# Patient Record
Sex: FEMALE | Race: WHITE | URBAN - METROPOLITAN AREA
[De-identification: names, ages, dates, MRNs, and addresses within clinical notes are randomized per-mention and may not be internally consistent; named-entity substitution may affect disease eponyms.]

---
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
## 2020-05-22 ENCOUNTER — TELEPHONE (OUTPATIENT)
Dept: FAMILY MEDICINE | Facility: CLINIC | Age: 20
End: 2020-05-22

## 2020-05-22 NOTE — TELEPHONE ENCOUNTER
[facility] Call Center    Phone Message    May a detailed message be left on voicemail: yes     Reason for Call: Other: [place]. The pt has been prescribed birth control by a different MD. She cannot reach him due to Covid? The pt is asking if Dr Ford will refill her birth control meds? Please call the pt to discuss. Thanks.    Action Taken: Message routed to:  [facility] ([facility]): Adrienne norwood prim    Travel Screening: Not Applicable

## 2020-05-22 NOTE — TELEPHONE ENCOUNTER
Called and LVM - patient will need to follow up with ordering provider or that clinic for refills. Otherwise go to an Urgent Care for med refill. Patient has not been in this clinic in 3 years, we are not able to prescribed medication for her.     Louise Chirinos RN  05/22/20  3:45 PM

## 2022-06-22 NOTE — PATIENT INSTRUCTIONS
Send me date of   Menactra (meningitis) vaccine  HPV vaccine dates    Calcium 1200mg daily  Vitamin D 1000 international unit(s) daily    Tetanus will do today    Preventive Health Recommendations  Female Ages 21 to 25     Yearly exam:   See your health care provider every year in order to  Review health changes.   Discuss preventive care.    Review your medicines if your doctor has prescribed any.    You should be tested each year for STDs (sexually transmitted diseases).     Talk to your provider about how often you should have cholesterol testing.    Get a Pap test every three years. If you have an abnormal result, your doctor may have you test more often.    If you are at risk for diabetes, you should have a diabetes test (fasting glucose).     Shots:   Get a flu shot each year.   Get a tetanus shot every 10 years.   Consider getting the shot (vaccine) that prevents cervical cancer (Gardasil).    Nutrition:   Eat at least 5 servings of fruits and vegetables each day.  Eat whole-grain bread, whole-wheat pasta and brown rice instead of white grains and rice.  Get adequate Calcium and Vitamin D.     Lifestyle  Exercise at least 150 minutes a week each week (30 minutes a day, 5 days a week). This will help you control your weight and prevent disease.  Limit alcohol to one drink per day.  No smoking.   Wear sunscreen to prevent skin cancer.  See your dentist every six months for an exam and cleaning.

## 2022-06-23 ENCOUNTER — OFFICE VISIT (OUTPATIENT)
Dept: FAMILY MEDICINE | Facility: CLINIC | Age: 22
End: 2022-06-23
Payer: COMMERCIAL

## 2022-06-23 VITALS
DIASTOLIC BLOOD PRESSURE: 80 MMHG | OXYGEN SATURATION: 97 % | HEART RATE: 97 BPM | WEIGHT: 157.04 LBS | BODY MASS INDEX: 25.24 KG/M2 | SYSTOLIC BLOOD PRESSURE: 125 MMHG | TEMPERATURE: 98 F | HEIGHT: 66 IN

## 2022-06-23 DIAGNOSIS — Z11.3 SCREEN FOR STD (SEXUALLY TRANSMITTED DISEASE): ICD-10-CM

## 2022-06-23 DIAGNOSIS — Z12.4 SCREENING FOR CERVICAL CANCER: ICD-10-CM

## 2022-06-23 DIAGNOSIS — F41.1 GENERALIZED ANXIETY DISORDER: ICD-10-CM

## 2022-06-23 DIAGNOSIS — F33.1 MODERATE EPISODE OF RECURRENT MAJOR DEPRESSIVE DISORDER (H): ICD-10-CM

## 2022-06-23 DIAGNOSIS — Z13.220 SCREENING FOR LIPID DISORDERS: ICD-10-CM

## 2022-06-23 DIAGNOSIS — Z00.00 ROUTINE GENERAL MEDICAL EXAMINATION AT A HEALTH CARE FACILITY: Primary | ICD-10-CM

## 2022-06-23 DIAGNOSIS — Z23 NEED FOR VACCINATION: ICD-10-CM

## 2022-06-23 DIAGNOSIS — F51.01 PRIMARY INSOMNIA: ICD-10-CM

## 2022-06-23 DIAGNOSIS — N94.6 DYSMENORRHEA: ICD-10-CM

## 2022-06-23 LAB
CHOLEST SERPL-MCNC: 195 MG/DL
HDLC SERPL-MCNC: 68 MG/DL
LDLC SERPL CALC-MCNC: 101 MG/DL
NONHDLC SERPL-MCNC: 127 MG/DL
TRIGL SERPL-MCNC: 132 MG/DL

## 2022-06-23 PROCEDURE — 87491 CHLMYD TRACH DNA AMP PROBE: CPT | Performed by: INTERNAL MEDICINE

## 2022-06-23 PROCEDURE — G0145 SCR C/V CYTO,THINLAYER,RESCR: HCPCS | Performed by: INTERNAL MEDICINE

## 2022-06-23 PROCEDURE — 87591 N.GONORRHOEAE DNA AMP PROB: CPT | Performed by: INTERNAL MEDICINE

## 2022-06-23 PROCEDURE — 86803 HEPATITIS C AB TEST: CPT | Performed by: INTERNAL MEDICINE

## 2022-06-23 PROCEDURE — 87389 HIV-1 AG W/HIV-1&-2 AB AG IA: CPT | Performed by: INTERNAL MEDICINE

## 2022-06-23 PROCEDURE — 80061 LIPID PANEL: CPT | Performed by: INTERNAL MEDICINE

## 2022-06-23 RX ORDER — TRAZODONE HYDROCHLORIDE 50 MG/1
50 TABLET, FILM COATED ORAL AT BEDTIME
Qty: 90 TABLET | Refills: 0 | Status: SHIPPED | OUTPATIENT
Start: 2022-06-23 | End: 2022-07-20

## 2022-06-23 RX ORDER — NORGESTIMATE AND ETHINYL ESTRADIOL 0.25-0.035
KIT ORAL
Qty: 84 TABLET | Refills: 3 | Status: SHIPPED | OUTPATIENT
Start: 2022-06-23 | End: 2023-03-10

## 2022-06-23 RX ORDER — BUSPIRONE HYDROCHLORIDE 5 MG/1
TABLET ORAL
COMMUNITY
Start: 2022-05-16 | End: 2022-06-23

## 2022-06-23 RX ORDER — TRAZODONE HYDROCHLORIDE 50 MG/1
TABLET, FILM COATED ORAL
COMMUNITY
Start: 2022-05-21 | End: 2022-06-23

## 2022-06-23 RX ORDER — BUSPIRONE HYDROCHLORIDE 5 MG/1
TABLET ORAL
COMMUNITY
Start: 2022-06-23 | End: 2022-07-20

## 2022-06-23 RX ORDER — ESCITALOPRAM OXALATE 20 MG/1
TABLET ORAL
COMMUNITY
Start: 2022-06-13 | End: 2022-07-21

## 2022-06-23 RX ORDER — FLUOXETINE 10 MG/1
10 CAPSULE ORAL DAILY
Qty: 30 CAPSULE | Refills: 1 | Status: SHIPPED | OUTPATIENT
Start: 2022-06-23 | End: 2022-07-18

## 2022-06-23 ASSESSMENT — ANXIETY QUESTIONNAIRES
5. BEING SO RESTLESS THAT IT IS HARD TO SIT STILL: SEVERAL DAYS
GAD7 TOTAL SCORE: 16
2. NOT BEING ABLE TO STOP OR CONTROL WORRYING: NEARLY EVERY DAY
3. WORRYING TOO MUCH ABOUT DIFFERENT THINGS: NEARLY EVERY DAY
6. BECOMING EASILY ANNOYED OR IRRITABLE: SEVERAL DAYS
GAD7 TOTAL SCORE: 16
1. FEELING NERVOUS, ANXIOUS, OR ON EDGE: NEARLY EVERY DAY
IF YOU CHECKED OFF ANY PROBLEMS ON THIS QUESTIONNAIRE, HOW DIFFICULT HAVE THESE PROBLEMS MADE IT FOR YOU TO DO YOUR WORK, TAKE CARE OF THINGS AT HOME, OR GET ALONG WITH OTHER PEOPLE: SOMEWHAT DIFFICULT
7. FEELING AFRAID AS IF SOMETHING AWFUL MIGHT HAPPEN: MORE THAN HALF THE DAYS

## 2022-06-23 ASSESSMENT — PATIENT HEALTH QUESTIONNAIRE - PHQ9
5. POOR APPETITE OR OVEREATING: NEARLY EVERY DAY
SUM OF ALL RESPONSES TO PHQ QUESTIONS 1-9: 13

## 2022-06-23 NOTE — NURSING NOTE
"22 year old  Chief Complaint   Patient presents with     Physical     Pt wanting to change her medications (lexapro) due to gaining. Pt's dog just had round worms and is wondering if she should be worried.       Blood pressure 125/80, pulse 97, temperature 98  F (36.7  C), height 1.677 m (5' 6.02\"), weight 71.2 kg (157 lb 0.6 oz), last menstrual period 06/09/2022, SpO2 97 %, not currently breastfeeding. Body mass index is 25.33 kg/m .  Patient Active Problem List   Diagnosis     Curvature of spine     Back pain     Scoliosis       Wt Readings from Last 2 Encounters:   06/23/22 71.2 kg (157 lb 0.6 oz)   07/14/17 51.7 kg (114 lb) (23 %, Z= -0.73)*     * Growth percentiles are based on [facility] (Girls, 2-20 Years) data.     BP Readings from Last 3 Encounters:   06/23/22 125/80   07/28/17 102/63   07/14/17 109/67         Current Outpatient Medications   Medication     Acetaminophen (MIDOL PO)     busPIRone (BUSPAR) 5 MG tablet     escitalopram (LEXAPRO) 20 MG tablet     Multiple Vitamins-Minerals (WOMENS MULTIVITAMIN  PLUS) TABS     norgestimate-ethinyl estradiol (SPRINTEC 28) 0.25-35 MG-MCG per tablet     Probiotic Product (PROBIOTIC PO)     traZODone (DESYREL) 50 MG tablet     amoxicillin (AMOXIL) 400 MG/5ML suspension     HYDROcodone-acetaminophen (LORTAB) 7.5-325 MG/15ML solution     No current facility-administered medications for this visit.       Social History     Tobacco Use     Smoking status: Never Smoker     Smokeless tobacco: Never Used     Tobacco comment: no second hand exposure   Substance Use Topics     Alcohol use: Yes     Comment: 5 drinks a week     Drug use: Yes     Types: Marijuana       Health Maintenance Due   Topic Date Due     CHLAMYDIA SCREENING  Never done     ADVANCE CARE PLANNING  Never done     HIV SCREENING  Never done     HEPATITIS C SCREENING  Never done     HPV IMMUNIZATION (3 - 3-dose series) 02/04/2019     PAP  Never done     DTAP/TDAP/TD IMMUNIZATION (7 - Td or Tdap) 05/17/2020     PHQ-2 " (once per calendar year)  01/01/2022       No results found for: PAP      June 23, 2022 2:53 PM

## 2022-06-23 NOTE — NURSING NOTE
Prior to immunization administration, verified patients identity using patient s name and date of birth. Please see Immunization Activity for additional information.     Screening Questionnaire for Adult Immunization    Are you sick today?   No   Do you have allergies to medications, food, a vaccine component or latex?   No   Have you ever had a serious reaction after receiving a vaccination?   No   Do you have a long-term health problem with heart, lung, kidney, or metabolic disease (e.g., diabetes), asthma, a blood disorder, no spleen, complement component deficiency, a cochlear implant, or a spinal fluid leak?  Are you on long-term aspirin therapy?   No   Do you have cancer, leukemia, HIV/AIDS, or any other immune system problem?   No   Do you have a parent, brother, or sister with an immune system problem?   No   In the past 3 months, have you taken medications that affect  your immune system, such as prednisone, other steroids, or anticancer drugs; drugs for the treatment of rheumatoid arthritis, Crohn s disease, or psoriasis; or have you had radiation treatments?   No   Have you had a seizure, or a brain or other nervous system problem?   No   During the past year, have you received a transfusion of blood or blood    products, or been given immune (gamma) globulin or antiviral drug?   No   For women: Are you pregnant or is there a chance you could become       pregnant during the next month?   No   Have you received any vaccinations in the past 4 weeks?   No     Immunization questionnaire answers were all negative.        Per orders of Dr. Ford, injection of Tdap given by Haydee Chiu MA. Patient instructed to remain in clinic for 15 minutes afterwards, and to report any adverse reaction to me immediately.       Screening performed by Haydee Chiu MA on 6/23/2022 at 4:16 PM.
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
## 2022-06-24 LAB
C TRACH DNA SPEC QL NAA+PROBE: NEGATIVE
HCV AB SERPL QL IA: NONREACTIVE
HIV 1+2 AB+HIV1 P24 AG SERPL QL IA: NONREACTIVE
N GONORRHOEA DNA SPEC QL NAA+PROBE: NEGATIVE

## 2022-06-28 PROBLEM — N94.6 DYSMENORRHEA: Status: ACTIVE | Noted: 2022-06-28

## 2022-06-28 PROBLEM — F41.1 GENERALIZED ANXIETY DISORDER: Status: ACTIVE | Noted: 2022-06-28

## 2022-06-28 PROBLEM — F51.01 PRIMARY INSOMNIA: Status: ACTIVE | Noted: 2022-06-28

## 2022-06-28 PROBLEM — F33.1 MODERATE EPISODE OF RECURRENT MAJOR DEPRESSIVE DISORDER (H): Status: ACTIVE | Noted: 2022-06-28

## 2022-06-28 LAB
BKR LAB AP GYN ADEQUACY: NORMAL
BKR LAB AP GYN INTERPRETATION: NORMAL
BKR LAB AP HPV REFLEX: NORMAL
BKR LAB AP LMP: NORMAL
BKR LAB AP PREVIOUS ABNORMAL: NORMAL
PATH REPORT.COMMENTS IMP SPEC: NORMAL
PATH REPORT.COMMENTS IMP SPEC: NORMAL
PATH REPORT.RELEVANT HX SPEC: NORMAL

## 2022-07-15 DIAGNOSIS — F33.1 MODERATE EPISODE OF RECURRENT MAJOR DEPRESSIVE DISORDER (H): ICD-10-CM

## 2022-07-15 DIAGNOSIS — F51.01 PRIMARY INSOMNIA: ICD-10-CM

## 2022-07-15 DIAGNOSIS — F41.1 GENERALIZED ANXIETY DISORDER: ICD-10-CM

## 2022-07-18 RX ORDER — FLUOXETINE 10 MG/1
10 CAPSULE ORAL DAILY
Qty: 30 CAPSULE | Refills: 0 | Status: SHIPPED | OUTPATIENT
Start: 2022-07-18 | End: 2022-07-21

## 2022-07-18 NOTE — TELEPHONE ENCOUNTER
Fluoxetine (Prozac) 10 mg    Last Office Visit: 6/23/22  [facility] [place]: 8/4/22  Medication last refilled: 6/23/22 #30 with 1 refill(s)    Last PHQ-9 6/23/2022   PHQ-9 Total Score 13     LARISA-7 SCORE 6/23/2022   Total Score 16     Prescription approved per [facility] Refill Protocol.    YESENIA BrothersN, RN, CCM

## 2022-07-20 ENCOUNTER — MYC MEDICAL ADVICE (OUTPATIENT)
Dept: FAMILY MEDICINE | Facility: CLINIC | Age: 22
End: 2022-07-20

## 2022-07-20 ENCOUNTER — MYC REFILL (OUTPATIENT)
Dept: FAMILY MEDICINE | Facility: CLINIC | Age: 22
End: 2022-07-20

## 2022-07-20 DIAGNOSIS — F51.01 PRIMARY INSOMNIA: ICD-10-CM

## 2022-07-20 DIAGNOSIS — F33.1 MODERATE EPISODE OF RECURRENT MAJOR DEPRESSIVE DISORDER (H): ICD-10-CM

## 2022-07-20 DIAGNOSIS — F41.1 GENERALIZED ANXIETY DISORDER: ICD-10-CM

## 2022-07-21 RX ORDER — TRAZODONE HYDROCHLORIDE 50 MG/1
50 TABLET, FILM COATED ORAL AT BEDTIME
Qty: 90 TABLET | Refills: 0 | Status: SHIPPED | OUTPATIENT
Start: 2022-07-21 | End: 2022-11-04

## 2022-07-21 RX ORDER — FLUOXETINE 10 MG/1
10 CAPSULE ORAL DAILY
Qty: 30 CAPSULE | Refills: 0 | Status: CANCELLED | OUTPATIENT
Start: 2022-07-21

## 2022-07-21 RX ORDER — BUSPIRONE HYDROCHLORIDE 5 MG/1
TABLET ORAL
Qty: 90 TABLET | Refills: 0 | Status: SHIPPED | OUTPATIENT
Start: 2022-07-21 | End: 2022-07-22 | Stop reason: DRUGHIGH

## 2022-07-21 NOTE — TELEPHONE ENCOUNTER
Trazodone (Desyrel) 50 mg + Buspirone (Buspar) 5 mg    Last Office Visit: 6/23/22  [facility] [place]4/22  Medication last refilled:     6/23/22 #90 with 0 refill(s)-Trazodone  6/23/22 #90 with 4 refill(s)-Buspirone (By Dr. Lim)    Last PHQ-9 6/23/2022   PHQ-9 Total Score 13     LARISA-7 SCORE 6/23/2022   Total Score 16     Routing refill request to provider for review/approval because:  Drug interaction warning between Fluoxetine and Trazodone.    Meri Do, BSN, RN, CCM

## 2022-07-21 NOTE — TELEPHONE ENCOUNTER
Meds refilled while PCP, Dr. Ford, is away.    Radha was seen today for refill request.    Diagnoses and all orders for this visit:    Primary insomnia  -     traZODone (DESYREL) 50 MG tablet; Take 1 tablet (50 mg) by mouth At Bedtime    Generalized anxiety disorder  -     busPIRone (BUSPAR) 5 MG tablet; Take 10mg up to three times a day prn anxiety    Moderate episode of recurrent major depressive disorder (H)      Miguel Barcenas MD  12:59 PM, July 21, 2022

## 2022-07-22 RX ORDER — BUSPIRONE HYDROCHLORIDE 5 MG/1
5 TABLET ORAL 3 TIMES DAILY PRN
Qty: 90 TABLET | Refills: 0 | Status: SHIPPED | OUTPATIENT
Start: 2022-07-22 | End: 2022-08-19

## 2022-08-18 DIAGNOSIS — F41.1 GENERALIZED ANXIETY DISORDER: ICD-10-CM

## 2022-08-18 NOTE — TELEPHONE ENCOUNTER
Last time prescribed: 7/22/22 , 90 tabs/caps x 0 refills  Last office visit: 6/23/22  Next appointment: No Future Appointment Scheduled

## 2022-08-19 RX ORDER — BUSPIRONE HYDROCHLORIDE 5 MG/1
5 TABLET ORAL 3 TIMES DAILY PRN
Qty: 90 TABLET | Refills: 1 | Status: SHIPPED | OUTPATIENT
Start: 2022-08-19 | End: 2022-10-19

## 2022-08-19 NOTE — TELEPHONE ENCOUNTER
Buspirone (Buspar) 5 mg    Last Office Visit: 6/23/22  [facility] [place] None  Medication last refilled: 7/22/22 #90 with 0 refill(s)    Last PHQ-9 6/23/2022   PHQ-9 Total Score 13     LARISA-7 SCORE 6/23/2022   Total Score 16     Prescription approved per [facility] Refill Protocol.    YESENIA BrothersN, RN, CCM

## 2022-10-18 DIAGNOSIS — F41.1 GENERALIZED ANXIETY DISORDER: ICD-10-CM

## 2022-10-18 DIAGNOSIS — F33.1 MODERATE EPISODE OF RECURRENT MAJOR DEPRESSIVE DISORDER (H): ICD-10-CM

## 2022-10-19 RX ORDER — BUSPIRONE HYDROCHLORIDE 5 MG/1
5 TABLET ORAL 3 TIMES DAILY PRN
Qty: 90 TABLET | Refills: 0 | Status: SHIPPED | OUTPATIENT
Start: 2022-10-19 | End: 2022-11-04

## 2022-10-19 NOTE — TELEPHONE ENCOUNTER
Medication requested: FLUoxetine (PROZAC) 20 MG capsule  Last office visit: 6/23/22  [facility] appointments: none  Medication last refilled: 7/21/22; 90 + 0 refills  Last qualifying labs:   PHQ-9 / LARISA-7 Scores 8/2015 to present 6/23/2022   LARISA-7 Score DocFlow 16   PHQ-9 Score DocFlow 13     Medication is being filled for 1 time refill only due to:  Pt was due to f/u for mental health med check in August, canceled appt same day and has not rescheduled    Sent message to pt to schedule follow-up with Dr. Ford.    Dick OZUNA, RN  10/19/22 12:48 PM

## 2022-10-19 NOTE — TELEPHONE ENCOUNTER
Medication requested: busPIRone (BUSPAR) 5 MG tablet  Last office visit: 6/23/22  [facility] appointments: none  Medication last refilled: 8/19/22; 90 + 1 refill  Last qualifying labs:   PHQ-9 / LARISA-7 Scores 8/2015 to present 6/23/2022   LARISA-7 Score DocFlow 16     Medication is being filled for 1 time refill only due to:  Pt was due to f/u for mental health med check in August, canceled appt same day and has not rescheduled    Sent message to pt to schedule follow-up with Dr. Ford.    Dick OZUNA, RN  10/19/22 12:57 PM

## 2022-11-04 ENCOUNTER — OFFICE VISIT (OUTPATIENT)
Dept: FAMILY MEDICINE | Facility: CLINIC | Age: 22
End: 2022-11-04
Payer: COMMERCIAL

## 2022-11-04 VITALS
OXYGEN SATURATION: 97 % | TEMPERATURE: 97.9 F | WEIGHT: 157.12 LBS | DIASTOLIC BLOOD PRESSURE: 73 MMHG | SYSTOLIC BLOOD PRESSURE: 104 MMHG | HEART RATE: 82 BPM | BODY MASS INDEX: 25.34 KG/M2 | RESPIRATION RATE: 16 BRPM

## 2022-11-04 DIAGNOSIS — Z23 NEED FOR PROPHYLACTIC VACCINATION AND INOCULATION AGAINST INFLUENZA: ICD-10-CM

## 2022-11-04 DIAGNOSIS — F41.1 GENERALIZED ANXIETY DISORDER: ICD-10-CM

## 2022-11-04 DIAGNOSIS — F33.1 MODERATE EPISODE OF RECURRENT MAJOR DEPRESSIVE DISORDER (H): Primary | ICD-10-CM

## 2022-11-04 DIAGNOSIS — F51.01 PRIMARY INSOMNIA: ICD-10-CM

## 2022-11-04 RX ORDER — TRAZODONE HYDROCHLORIDE 50 MG/1
TABLET, FILM COATED ORAL
Qty: 90 TABLET | Refills: 1 | Status: SHIPPED | OUTPATIENT
Start: 2022-11-04 | End: 2023-03-28

## 2022-11-04 RX ORDER — FLUOXETINE 40 MG/1
40 CAPSULE ORAL DAILY
Qty: 90 CAPSULE | Refills: 1 | Status: SHIPPED | OUTPATIENT
Start: 2022-11-04 | End: 2023-05-17

## 2022-11-04 RX ORDER — BUSPIRONE HYDROCHLORIDE 5 MG/1
5 TABLET ORAL 3 TIMES DAILY PRN
Qty: 90 TABLET | Refills: 0 | Status: SHIPPED | OUTPATIENT
Start: 2022-11-04 | End: 2023-05-25

## 2022-11-04 ASSESSMENT — PATIENT HEALTH QUESTIONNAIRE - PHQ9
SUM OF ALL RESPONSES TO PHQ QUESTIONS 1-9: 13
5. POOR APPETITE OR OVEREATING: SEVERAL DAYS

## 2022-11-04 ASSESSMENT — ANXIETY QUESTIONNAIRES
GAD7 TOTAL SCORE: 13
2. NOT BEING ABLE TO STOP OR CONTROL WORRYING: NEARLY EVERY DAY
1. FEELING NERVOUS, ANXIOUS, OR ON EDGE: MORE THAN HALF THE DAYS
3. WORRYING TOO MUCH ABOUT DIFFERENT THINGS: NEARLY EVERY DAY
7. FEELING AFRAID AS IF SOMETHING AWFUL MIGHT HAPPEN: MORE THAN HALF THE DAYS
IF YOU CHECKED OFF ANY PROBLEMS ON THIS QUESTIONNAIRE, HOW DIFFICULT HAVE THESE PROBLEMS MADE IT FOR YOU TO DO YOUR WORK, TAKE CARE OF THINGS AT HOME, OR GET ALONG WITH OTHER PEOPLE: SOMEWHAT DIFFICULT
5. BEING SO RESTLESS THAT IT IS HARD TO SIT STILL: SEVERAL DAYS
GAD7 TOTAL SCORE: 13
6. BECOMING EASILY ANNOYED OR IRRITABLE: SEVERAL DAYS

## 2022-11-04 ASSESSMENT — PAIN SCALES - GENERAL: PAINLEVEL: NO PAIN (0)

## 2022-11-04 NOTE — PATIENT INSTRUCTIONS
Light Box information  Positioning and distance - The light box should be positioned at a distance that enables patients to receive 10,000 lux while seated and facing the box, with the light projected downward to minimize aversive glare. Commercial light box instructions should give the distance at which 10,000 lux is achieved, which is typically approximately 40 to 80 cm (16 to 31 inches). The distance from the eyes to the light box is important because light intensity follows the inverse square law; if the distance between the eye and the light source is doubled, the intensity of light that is received drops to one-quarter of the original intensity.  ?Time of day - Bright light therapy generally commences in the early morning, soon after awakening (eg, 7:00 AM). Patients should administer light therapy at approximately the same time each day, including weekends, holidays, and vacations. Most light therapy studies required a regular time for light treatment to start and thus stipulated a regular wake-up time for the subjects. A regular wake-up time may be important for optimal effectiveness of the bright light treatment. The effectiveness of variable timing of the bright light therapy is unknown.  If morning bright light treatment alone is not fully effective after two to four weeks of treatment, adding evening (eg, 8:00 PM) bright light treatment may be helpful. A minority of patients with SAD may benefit from bright light in the evening rather than morning bright light [81]. The self-report version of the Morningness-Eveningness Questionnaire is in the public domain and can be used to determine the best time of day to commence bright light therapy. However, the questionnaire is generally used for research rather than standard clinical care.  ?Duration of exposure - The duration of early morning exposure to standard light boxes emitting 10,000 lux is generally 30 minutes/day, but some studies have used 45 or 60  minutes per session. However, no head-to-head trials have compared the efficacy of different lengths of exposure.   For patients who do not respond to initial treatment with 30 minutes/day, some studies have increased the duration of exposure to 45 minutes/day, and if nonresponse persists, to 60 minutes/day (algorithm 1). However, no studies have compared the efficacy of a fixed dose of 30 minutes/day with an increasing dose, and the potential benefit of increasing exposure must be weighed against the added time burden.  If evening bright light therapy is added, the duration of evening exposure is generally 30 to 60 minutes. Patients who switch from morning to evening exposure continue the same length of exposure.  Bright light boxes emitting light that is less than 10,000 lux require longer exposures. As an example, morning light therapy with a 2500 lux light box requires an exposure of two hours to achieve the same benefit of 10,000 lux for 30 minutes.   ?Looking at the device - The eyes are open during bright light therapy, with light visible at least in the peripheral vision. Patients can glance at the box but should avoid staring directly at the light.  ?Patient activity - During bright light therapy, patients can engage in any activity, such as reading, eating, watching television, or working on a computer. Although patients are typically seated, it is reasonable to place the light box on a stand so that patients can engage in other activities, such as riding a stationary bicycle.
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
## 2023-01-12 DIAGNOSIS — F51.01 PRIMARY INSOMNIA: ICD-10-CM

## 2023-01-12 RX ORDER — TRAZODONE HYDROCHLORIDE 50 MG/1
TABLET, FILM COATED ORAL
Qty: 90 TABLET | Refills: 1 | Status: CANCELLED | OUTPATIENT
Start: 2023-01-12

## 2023-03-10 DIAGNOSIS — N94.6 DYSMENORRHEA: ICD-10-CM

## 2023-03-10 RX ORDER — NORGESTIMATE AND ETHINYL ESTRADIOL 0.25-0.035
KIT ORAL
Qty: 84 TABLET | Refills: 0 | Status: SHIPPED | OUTPATIENT
Start: 2023-03-10 | End: 2023-08-21

## 2023-03-10 NOTE — TELEPHONE ENCOUNTER
Medication requested: norgestimate-ethinyl estradiol (SPRINTEC 28) 0.25-35 MG-MCG tablet  Last office visit: 12/23/22  [facility] appointments: none  Medication last refilled: 6/23/22; 84 + 3 refills  Last qualifying labs:   BP Readings from Last 3 Encounters:   11/04/22 104/73   06/23/22 125/80   07/28/17 102/63     Prescription approved per [facility] Refill Protocol.    Pharmacy claims they do not have any refills remaining on prescription, but she should have one more 90 day supply. Will send one more 90 day supply as intended with original prescription.    Dick OZUNA, RN  03/10/23 10:32 AM

## 2023-03-28 DIAGNOSIS — F51.01 PRIMARY INSOMNIA: ICD-10-CM

## 2023-03-28 RX ORDER — TRAZODONE HYDROCHLORIDE 50 MG/1
TABLET, FILM COATED ORAL
Qty: 90 TABLET | Refills: 1 | Status: SHIPPED | OUTPATIENT
Start: 2023-03-28 | End: 2023-07-03

## 2023-03-28 NOTE — TELEPHONE ENCOUNTER
Medication requested: traZODone (DESYREL) 50 MG tablet  Last office visit: 11/4/22  [facility] appointments: none  Medication last refilled: 11/4/22; 90 + 1 refills  Last qualifying labs: N/A    Prescription approved per [facility] Refill Protocol.    Dick OZUNA, RN  03/28/23 4:12 PM

## 2023-05-15 DIAGNOSIS — F33.1 MODERATE EPISODE OF RECURRENT MAJOR DEPRESSIVE DISORDER (H): ICD-10-CM

## 2023-05-15 DIAGNOSIS — F41.1 GENERALIZED ANXIETY DISORDER: ICD-10-CM

## 2023-05-17 RX ORDER — FLUOXETINE 40 MG/1
40 CAPSULE ORAL DAILY
Qty: 30 CAPSULE | Refills: 0 | Status: SHIPPED | OUTPATIENT
Start: 2023-05-17 | End: 2023-06-21

## 2023-05-17 NOTE — TELEPHONE ENCOUNTER
Last visit 11/4/22, no future visit  Medication is being filled for 1 time refill only due to:  Patient needs to be seen because mental health check visit.   Routing refill request to provider for review/approval because:  Drug interaction warning      Sending [employer] message to pt that she needs appt in the next month. Refilling med x 30 days.      Adilene Potter RN  [facility]

## 2023-05-25 DIAGNOSIS — F41.1 GENERALIZED ANXIETY DISORDER: ICD-10-CM

## 2023-05-25 RX ORDER — BUSPIRONE HYDROCHLORIDE 5 MG/1
5 TABLET ORAL 3 TIMES DAILY PRN
Qty: 30 TABLET | Refills: 0 | Status: SHIPPED | OUTPATIENT
Start: 2023-05-25 | End: 2023-08-21

## 2023-05-25 NOTE — TELEPHONE ENCOUNTER
Buspirone (Buspar) 5 mg    Last Office Visit: 11/4/22  [place] [facility] [place]  Medication last refilled: 11/4/22 #90 with 0 refill(s)    PHQ-9 / LARISA-7 Scores 6/23/2022  3:30 PM 11/4/2022  9:06 AM   LARISA-7 Score DocFlow 16 13    PHQ-9 Score DocFlow 13 13      Medication is being filled for 1 time refill only due to:  Patient needs to be seen because due for mental health follow up.    mobileo message sent requesting Elo call to schedule an appointment.    Meri Do, YESENIAN, RN, CCM

## 2023-06-21 DIAGNOSIS — F41.1 GENERALIZED ANXIETY DISORDER: ICD-10-CM

## 2023-06-21 DIAGNOSIS — F33.1 MODERATE EPISODE OF RECURRENT MAJOR DEPRESSIVE DISORDER (H): ICD-10-CM

## 2023-06-21 RX ORDER — FLUOXETINE 40 MG/1
40 CAPSULE ORAL DAILY
Qty: 30 CAPSULE | Refills: 0 | Status: SHIPPED | OUTPATIENT
Start: 2023-06-21 | End: 2023-08-21

## 2023-06-21 NOTE — TELEPHONE ENCOUNTER
Fluoxetine (Prozac) 40 mg    Last Office Visit: 12/23/22  Future [facility] Appointments: None  Medication last refilled: 5/17/23 #30 with 0 refill(s)    PHQ-9 / LARISA-7 Scores  6/23/22 11/4/22   LARISA-7 Score DocFlow 16 4   PHQ-9 Score DocFlow 13 4     Medication is being filled for 1 time refill only due to:  Patient needs to be seen because due for mental health follow up.     Message sent to scheduling to call Elo to schedule as she has not read the [employer] request sent on 5/25/23.    Meri Do, YESENIAN, RN, CCM

## 2023-07-03 DIAGNOSIS — F51.01 PRIMARY INSOMNIA: ICD-10-CM

## 2023-07-03 RX ORDER — TRAZODONE HYDROCHLORIDE 50 MG/1
TABLET, FILM COATED ORAL
Qty: 30 TABLET | Refills: 0 | Status: SHIPPED | OUTPATIENT
Start: 2023-07-03 | End: 2023-08-21

## 2023-07-03 NOTE — TELEPHONE ENCOUNTER
Trazodone (Desyrel) 50 mg    Last Office Visit: 12/23/22  Future [facility] Appointments: None  Medication last refilled: 3/28/23 #90 with 1 refill(s)    Medication is being filled for 1 time refill only due to:  Patient needs to be seen because due for mental health follow up.    [employer] message sent to patient to call and schedule appointment.    YESENIA BrothersN, RN, CCM

## 2023-07-19 DIAGNOSIS — F41.1 GENERALIZED ANXIETY DISORDER: ICD-10-CM

## 2023-07-19 DIAGNOSIS — F33.1 MODERATE EPISODE OF RECURRENT MAJOR DEPRESSIVE DISORDER (H): ICD-10-CM

## 2023-07-20 RX ORDER — FLUOXETINE 40 MG/1
40 CAPSULE ORAL DAILY
Qty: 30 CAPSULE | Refills: 0 | OUTPATIENT
Start: 2023-07-20

## 2023-08-14 DIAGNOSIS — N94.6 DYSMENORRHEA: ICD-10-CM

## 2023-08-15 RX ORDER — NORGESTIMATE AND ETHINYL ESTRADIOL 0.25-0.035
KIT ORAL
Qty: 84 TABLET | Refills: 0 | OUTPATIENT
Start: 2023-08-15

## 2023-08-21 ENCOUNTER — MYC REFILL (OUTPATIENT)
Dept: FAMILY MEDICINE | Facility: CLINIC | Age: 23
End: 2023-08-21

## 2023-08-21 DIAGNOSIS — F33.1 MODERATE EPISODE OF RECURRENT MAJOR DEPRESSIVE DISORDER (H): ICD-10-CM

## 2023-08-21 DIAGNOSIS — F41.1 GENERALIZED ANXIETY DISORDER: ICD-10-CM

## 2023-08-21 DIAGNOSIS — N94.6 DYSMENORRHEA: ICD-10-CM

## 2023-08-21 DIAGNOSIS — F51.01 PRIMARY INSOMNIA: ICD-10-CM

## 2023-08-21 RX ORDER — FLUOXETINE 40 MG/1
40 CAPSULE ORAL DAILY
Qty: 30 CAPSULE | Refills: 0 | Status: SHIPPED | OUTPATIENT
Start: 2023-08-21 | End: 2023-09-13

## 2023-08-21 RX ORDER — BUSPIRONE HYDROCHLORIDE 5 MG/1
5 TABLET ORAL 3 TIMES DAILY PRN
Qty: 30 TABLET | Refills: 0 | Status: SHIPPED | OUTPATIENT
Start: 2023-08-21

## 2023-08-21 RX ORDER — TRAZODONE HYDROCHLORIDE 50 MG/1
TABLET, FILM COATED ORAL
Qty: 30 TABLET | Refills: 0 | Status: SHIPPED | OUTPATIENT
Start: 2023-08-21

## 2023-08-21 RX ORDER — NORGESTIMATE AND ETHINYL ESTRADIOL 0.25-0.035
KIT ORAL
Qty: 84 TABLET | Refills: 0 | Status: CANCELLED | OUTPATIENT
Start: 2023-08-21

## 2023-08-21 RX ORDER — NORGESTIMATE AND ETHINYL ESTRADIOL 0.25-0.035
KIT ORAL
Qty: 84 TABLET | Refills: 0 | Status: SHIPPED | OUTPATIENT
Start: 2023-08-21 | End: 2023-11-13

## 2023-08-21 NOTE — TELEPHONE ENCOUNTER
Last visit 11/4/22, future appt 9/8/23  Medication is being filled for 1 time refill only due to:  Patient needs to be seen because needs mental health followup - has appt 9/8/23.   Adilene Potter RN  [facility]

## 2023-08-21 NOTE — TELEPHONE ENCOUNTER
Duplicate refill request for OCP's - denied refill.  Adilene Potter RN  [facility]

## 2023-09-10 ENCOUNTER — HEALTH MAINTENANCE LETTER (OUTPATIENT)
Age: 23
End: 2023-09-10

## 2023-09-13 DIAGNOSIS — F41.1 GENERALIZED ANXIETY DISORDER: ICD-10-CM

## 2023-09-13 DIAGNOSIS — F33.1 MODERATE EPISODE OF RECURRENT MAJOR DEPRESSIVE DISORDER (H): ICD-10-CM

## 2023-09-13 RX ORDER — FLUOXETINE 40 MG/1
40 CAPSULE ORAL DAILY
Qty: 30 CAPSULE | Refills: 0 | Status: SHIPPED | OUTPATIENT
Start: 2023-09-13

## 2023-09-13 NOTE — TELEPHONE ENCOUNTER
Patient had follow up appointment scheduled 9/8/23 for medication follow up that had to be canceled -- provider had to cancel clinic visits that day. Please advise.    Blossom Godoy LPN   11:03 AM 09/13/23

## 2023-09-13 NOTE — TELEPHONE ENCOUNTER
Fluoxetine (Prozac) 40 mg    Last Office Visit: 12/23/22  [facility] Appointments: None  Medication last refilled: 8/21/23 #30 with 0 refill(s)    PHQ-9 / LARISA-7 Scores  6/23/22 11/4/22    LARISA-7 Score DocFlow 16 13   PHQ-9 Score DocFlow 13 13     Medication is being filled for 1 time refill only due to:  Patient needs to be seen because due for mental health follow up.  Patient had appointment which was cancelled due to provider illness.  Patient needs to reschedule appointment.    [employer] message sent to patient to call and schedule appointment.    Meri Do, YESENIAN, RN, CCM

## 2023-10-02 DIAGNOSIS — F33.1 MODERATE EPISODE OF RECURRENT MAJOR DEPRESSIVE DISORDER (H): ICD-10-CM

## 2023-10-02 DIAGNOSIS — F41.1 GENERALIZED ANXIETY DISORDER: ICD-10-CM

## 2023-10-03 RX ORDER — FLUOXETINE 40 MG/1
40 CAPSULE ORAL DAILY
Qty: 30 CAPSULE | Refills: 0 | OUTPATIENT
Start: 2023-10-03

## 2023-11-13 DIAGNOSIS — N94.6 DYSMENORRHEA: ICD-10-CM

## 2023-11-14 RX ORDER — NORGESTIMATE AND ETHINYL ESTRADIOL 0.25-0.035
KIT ORAL
Qty: 84 TABLET | Refills: 0 | Status: SHIPPED | OUTPATIENT
Start: 2023-11-14 | End: 2024-02-14

## 2023-11-14 NOTE — TELEPHONE ENCOUNTER
Medication requested: norgestimate-ethinyl estradiol (SPRINTEC 28) 0.25-35 MG-MCG tablet   Last office visit: 11/04/2022  [facility] appointments: none  Medication last refilled: 8/21/2023; 84 + 0 refills  Last qualifying labs: n/a    Routing refill request to provider for review/approval because:  Patient needs to be seen because it has been more than 1 year since last office visit.  Last 3 scheduled visits; 2 were canceled by pt and 1 was no show.    SULMA Daniel, RN  11/14/23, 10:56 AM

## 2024-02-14 DIAGNOSIS — N94.6 DYSMENORRHEA: ICD-10-CM

## 2024-02-14 RX ORDER — NORGESTIMATE AND ETHINYL ESTRADIOL 0.25-0.035
KIT ORAL
Qty: 28 TABLET | Refills: 0 | Status: SHIPPED | OUTPATIENT
Start: 2024-02-14

## 2024-02-14 NOTE — TELEPHONE ENCOUNTER
Norgestimate-ethinyl estradiol (Sprintec) 0.25-35 MG-MCG    Last Office Visit: 12/23/22  Future [facility] Appointments: None  Medication last refilled: 11/14/23 #84 with 0 refill(s)    Vital Signs Systolic Diastolic   11/4/22 104 73   6/23/22 125 80     Medication is being filled for 1 time refill only due to:  Patient needs to be seen because it has been more than one year since last visit.    [employer] message sent to patient to call and schedule appointment.    Meri Do, YESENIAN, RN, CCM

## 2024-11-03 ENCOUNTER — HEALTH MAINTENANCE LETTER (OUTPATIENT)
Age: 24
End: 2024-11-03

## (undated) DEVICE — DECANTER TRANSFER DEVICE 2008S

## (undated) DEVICE — GOWN XLG DISP 9545

## (undated) DEVICE — TUBING SUCTION 10'X3/16" N510

## (undated) DEVICE — CATH INTERMITTENT CLEAN-CATH 8FR 16" VINYL LF 421708

## (undated) DEVICE — PACK SET-UP STD 9102

## (undated) DEVICE — SUCTION CANISTER MEDIVAC LINER 1500ML W/LID 65651-515

## (undated) DEVICE — SYR 10ML FINGER CONTROL W/O NDL 309695

## (undated) DEVICE — NDL 19GA 1.5"

## (undated) DEVICE — MARKER SKIN DOUBLE TIP W/FLEXI-RULER W/LABELS

## (undated) DEVICE — BASIN SET MINOR DISP

## (undated) DEVICE — DRAPE SHEET HALF 40X60" 9358

## (undated) DEVICE — SOL NACL 0.9% INJ 1000ML BAG 07983-09

## (undated) DEVICE — GLOVE PROTEXIS W/NEU-THERA 7.5  2D73TE75

## (undated) DEVICE — SYR EAR BULB 3OZ 0035830

## (undated) DEVICE — NDL 25GA 1.5" 305127

## (undated) DEVICE — ANTIFOG SOLUTION W/FOAM PAD CF-1001

## (undated) DEVICE — SUCTION TIP YANKAUER W/O VENT K86

## (undated) DEVICE — ESU COBLATOR  EVAC 10" 70DEG XTRA W/CABLE EICA5872-01

## (undated) DEVICE — SOL WATER IRRIG 1000ML BOTTLE 07139-09